# Patient Record
Sex: MALE | URBAN - METROPOLITAN AREA
[De-identification: names, ages, dates, MRNs, and addresses within clinical notes are randomized per-mention and may not be internally consistent; named-entity substitution may affect disease eponyms.]

---

## 2020-07-27 ENCOUNTER — APPOINTMENT (RX ONLY)
Dept: URBAN - METROPOLITAN AREA CLINIC 16 | Facility: CLINIC | Age: 45
Setting detail: DERMATOLOGY
End: 2020-07-27

## 2020-07-27 DIAGNOSIS — D485 NEOPLASM OF UNCERTAIN BEHAVIOR OF SKIN: ICD-10-CM

## 2020-07-27 DIAGNOSIS — L30.9 DERMATITIS, UNSPECIFIED: ICD-10-CM

## 2020-07-27 PROBLEM — D48.5 NEOPLASM OF UNCERTAIN BEHAVIOR OF SKIN: Status: ACTIVE | Noted: 2020-07-27

## 2020-07-27 PROCEDURE — ? MEDICATION COUNSELING

## 2020-07-27 PROCEDURE — 11104 PUNCH BX SKIN SINGLE LESION: CPT

## 2020-07-27 PROCEDURE — ? COUNSELING

## 2020-07-27 PROCEDURE — ? SHAVE REMOVAL

## 2020-07-27 PROCEDURE — 11300 SHAVE SKIN LESION 0.5 CM/<: CPT | Mod: 59

## 2020-07-27 PROCEDURE — ? TREATMENT REGIMEN

## 2020-07-27 PROCEDURE — ? PRESCRIPTION

## 2020-07-27 PROCEDURE — ? BIOPSY BY PUNCH METHOD

## 2020-07-27 PROCEDURE — ? DIAGNOSIS COMMENT

## 2020-07-27 RX ORDER — DOXYCYCLINE HYCLATE 100 MG/1
CAPSULE, GELATIN COATED ORAL
Qty: 60 | Refills: 0 | Status: ERX | COMMUNITY
Start: 2020-07-27

## 2020-07-27 RX ORDER — CLOBETASOL PROPIONATE 0.5 MG/G
AEROSOL, FOAM TOPICAL
Qty: 1 | Refills: 1 | Status: ERX | COMMUNITY
Start: 2020-07-27

## 2020-07-27 RX ADMIN — CLOBETASOL PROPIONATE: 0.5 AEROSOL, FOAM TOPICAL at 00:00

## 2020-07-27 RX ADMIN — DOXYCYCLINE HYCLATE: 100 CAPSULE, GELATIN COATED ORAL at 00:00

## 2020-07-27 ASSESSMENT — LOCATION SIMPLE DESCRIPTION DERM
LOCATION SIMPLE: ABDOMEN
LOCATION SIMPLE: POSTERIOR SCALP

## 2020-07-27 ASSESSMENT — LOCATION ZONE DERM
LOCATION ZONE: SCALP
LOCATION ZONE: TRUNK

## 2020-07-27 ASSESSMENT — LOCATION DETAILED DESCRIPTION DERM
LOCATION DETAILED: POSTERIOR MID-PARIETAL SCALP
LOCATION DETAILED: EPIGASTRIC SKIN

## 2020-07-27 NOTE — PROCEDURE: BIOPSY BY PUNCH METHOD
Detail Level: Simple
Was A Bandage Applied: Yes
Punch Size In Mm: 4
Biopsy Type: H and E
Anesthesia Type: 1% lidocaine with 1:100,000 epinephrine
Anesthesia Volume In Cc (Will Not Render If 0): 1.5
Additional Anesthesia Volume In Cc (Will Not Render If 0): 0
Hemostasis: None
Epidermal Sutures: 4-0 Prolene
Wound Care: Petrolatum
Dressing: telfa dressing
Suture Removal: 14 days
Patient Will Remove Sutures At Home?: No
Lab: 6
Consent: Written consent was obtained and risks were reviewed including but not limited to scarring, infection, bleeding, scabbing, incomplete removal, nerve damage and allergy to anesthesia.
Post-Care Instructions: I reviewed with the patient in detail post-care instructions. Patient is to keep the biopsy site dry overnight, and then apply bacitracin twice daily until healed. Patient may apply hydrogen peroxide soaks to remove any crusting.
Home Suture Removal Text: Patient was provided a home suture removal kit and will remove their sutures at home.  If they have any questions or difficulties they will call the office.
Notification Instructions: Patient will be notified of biopsy results. However, patient instructed to call the office if not contacted within 2 weeks.
Billing Type: Third-Party Bill
Information: Selecting Yes will display possible errors in your note based on the variables you have selected. This validation is only offered as a suggestion for you. PLEASE NOTE THAT THE VALIDATION TEXT WILL BE REMOVED WHEN YOU FINALIZE YOUR NOTE. IF YOU WANT TO FAX A PRELIMINARY NOTE YOU WILL NEED TO TOGGLE THIS TO 'NO' IF YOU DO NOT WANT IT IN YOUR FAXED NOTE.

## 2020-07-27 NOTE — PROCEDURE: SHAVE REMOVAL
Medical Necessity Information: It is in your best interest to select a reason for this procedure from the list below. All of these items fulfill various CMS LCD requirements except the new and changing color options.
Medical Necessity Clause: This procedure was medically necessary because the lesion that was treated was:
Lab: 6
Detail Level: Detailed
Was A Bandage Applied: Yes
Size Of Lesion In Cm (Required): 0.5
X Size Of Lesion In Cm (Optional): 0
Biopsy Method: 15 blade
Anesthesia Type: 1% lidocaine without epinephrine
Anesthesia Volume In Cc: 1.5
Hemostasis: Aluminum Chloride
Wound Care: Petrolatum
Path Notes (To The Dermatopathologist): Please check margins.
Render Path Notes In Note?: No
Consent was obtained from the patient. The risks and benefits to therapy were discussed in detail. Specifically, the risks of infection, scarring, bleeding, prolonged wound healing, incomplete removal, allergy to anesthesia, nerve injury and recurrence were addressed. Prior to the procedure, the treatment site was clearly identified and confirmed by the patient. All components of Universal Protocol/PAUSE Rule completed.
Post-Care Instructions: I reviewed with the patient in detail post-care instructions. Patient is to keep the biopsy site dry overnight, and then apply bacitracin twice daily until healed. Patient may apply hydrogen peroxide soaks to remove any crusting.
Notification Instructions: Patient will be notified of biopsy results. However, patient instructed to call the office if not contacted within 2 weeks.
Billing Type: Third-Party Bill

## 2020-07-27 NOTE — PROCEDURE: MEDICATION COUNSELING
Xelfrediz Pregnancy And Lactation Text: This medication is Pregnancy Category D and is not considered safe during pregnancy.  The risk during breast feeding is also uncertain.

## 2020-07-27 NOTE — PROCEDURE: DIAGNOSIS COMMENT
Comment: Erythematous scarred patch on posterior parietal scalp with focal crusting around hair follicles. DDx: lichen planopilaris vs discoid lupus vs folliculitis decalvans (presentation may be altered due to current use of antibiotics). Biopsy today to try to differentiate between these entities. Plan to start oral doxycycline and topical steroid, further treatment plan pending biopsy results. \\n\\nNote: patient is currently on penicillin for recent tooth infection but states that his last day of taking it was this morning.
Detail Level: Simple

## 2020-08-10 ENCOUNTER — APPOINTMENT (RX ONLY)
Dept: URBAN - METROPOLITAN AREA CLINIC 16 | Facility: CLINIC | Age: 45
Setting detail: DERMATOLOGY
End: 2020-08-10

## 2020-08-10 DIAGNOSIS — L66.2 FOLLICULITIS DECALVANS: ICD-10-CM

## 2020-08-10 DIAGNOSIS — Z48.02 ENCOUNTER FOR REMOVAL OF SUTURES: ICD-10-CM

## 2020-08-10 PROCEDURE — ? COUNSELING

## 2020-08-10 PROCEDURE — ? DIAGNOSIS COMMENT

## 2020-08-10 PROCEDURE — ? TREATMENT REGIMEN

## 2020-08-10 PROCEDURE — ? MEDICATION COUNSELING

## 2020-08-10 PROCEDURE — 99213 OFFICE O/P EST LOW 20 MIN: CPT

## 2020-08-10 PROCEDURE — ? PRESCRIPTION

## 2020-08-10 PROCEDURE — ? SUTURE REMOVAL (NO GLOBAL PERIOD)

## 2020-08-10 RX ORDER — DOXYCYCLINE HYCLATE 100 MG/1
CAPSULE, GELATIN COATED ORAL
Qty: 60 | Refills: 1 | Status: ERX

## 2020-08-10 ASSESSMENT — LOCATION ZONE DERM: LOCATION ZONE: SCALP

## 2020-08-10 ASSESSMENT — LOCATION DETAILED DESCRIPTION DERM
LOCATION DETAILED: POSTERIOR MID-PARIETAL SCALP
LOCATION DETAILED: HAIR

## 2020-08-10 ASSESSMENT — LOCATION SIMPLE DESCRIPTION DERM
LOCATION SIMPLE: POSTERIOR SCALP
LOCATION SIMPLE: HAIR

## 2020-08-10 NOTE — PROCEDURE: MEDICATION COUNSELING
Routing refill request to provider for review/approval because:  Medication is reported/historical  Please authorize if appropriate.  Thanks,  Caroline Harmon RN           Opioid Counseling: I discussed with the patient the potential side effects of opioids including but not limited to addiction, altered mental status, and depression. I stressed avoiding alcohol, benzodiazepines, muscle relaxants and sleep aids unless specifically okayed by a physician. The patient verbalized understanding of the proper use and possible adverse effects of opioids. All of the patient's questions and concerns were addressed. They were instructed to flush the remaining pills down the toilet if they did not need them for pain.

## 2020-08-10 NOTE — PROCEDURE: DIAGNOSIS COMMENT
Comment: Biopsy-proven at last visit under [id number], consistent with inflammatory scarring alopecia. 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 Reviewed diagnosis and treatment options in detail today, reviewed natural progression of disease including risk of relapse off of treatment. Discussed that this is a scarring hair loss and that patient already has scarring on exam - reviewed realistic expectations for hair regrowth. Did not perform culture at last visit as patient was on penicillin for tooth infection and not performing today as no pustules on exam and patient is on doxycycline. \\n\\nPatient improving on exam today after starting doxycycline and topical clobetasol foam 2 weeks ago, less inflammation on exam today. He says that symptomatically he is improved and noticing less bumps compared to a few weeks ago. Given improvement, no need to add IL TAC at this time. Plan to continue doxycycline for an additional 10 weeks and stop at f/u if still well controlled. Pending response, other treatment options to consider in future including oral clindamycin + rifampin or oral isotretinoin. Comment: Biopsy-proven at last visit under [id number], consistent with inflammatory scarring alopecia. 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 Reviewed diagnosis and treatment options in detail today, reviewed natural progression of disease including risk of relapse off of treatment. Discussed that this is a scarring hair loss and that patient already has scarring on exam - reviewed realistic expectations for hair regrowth. Did not perform culture at last visit as patient was on penicillin for tooth infection and not performing today as no pustules on exam and patient is on doxycycline. \\n\\nPatient improving on exam today after starting doxycycline and topical clobetasol foam 2 weeks ago, less inflammation on exam today. He says that symptomatically he is improved and noticing less bumps compared to a few weeks ago. Given improvement, no need to add IL TAC at this time. Plan to continue doxycycline for an additional 10 weeks and stop at f/u if still well controlled. Pending response, other treatment options to consider in future including oral clindamycin + rifampin or oral isotretinoin.

## 2020-08-12 ENCOUNTER — APPOINTMENT (RX ONLY)
Dept: URBAN - METROPOLITAN AREA CLINIC 16 | Facility: CLINIC | Age: 45
Setting detail: DERMATOLOGY
End: 2020-08-12

## 2020-08-12 DIAGNOSIS — L66.2 FOLLICULITIS DECALVANS: ICD-10-CM

## 2020-08-12 PROCEDURE — ? PRESCRIPTION

## 2020-08-12 PROCEDURE — ? COUNSELING

## 2020-08-12 PROCEDURE — ? DIAGNOSIS COMMENT

## 2020-08-12 RX ORDER — MINOCYCLINE HYDROCHLORIDE 100 MG/1
CAPSULE ORAL BID
Qty: 60 | Refills: 2 | Status: ERX | COMMUNITY
Start: 2020-08-12

## 2020-08-12 RX ADMIN — MINOCYCLINE HYDROCHLORIDE: 100 CAPSULE ORAL at 00:00

## 2020-08-12 NOTE — PROCEDURE: DIAGNOSIS COMMENT
Detail Level: Simple
Comment: Patient’s wife called that he is developing too much sun sensitivity on doxycycline. Sending in prescription for minocycline instead. MA to inform patient.

## 2020-10-19 ENCOUNTER — APPOINTMENT (RX ONLY)
Dept: URBAN - METROPOLITAN AREA CLINIC 16 | Facility: CLINIC | Age: 45
Setting detail: DERMATOLOGY
End: 2020-10-19

## 2020-10-19 DIAGNOSIS — L66.2 FOLLICULITIS DECALVANS: ICD-10-CM

## 2020-10-19 PROCEDURE — 99213 OFFICE O/P EST LOW 20 MIN: CPT

## 2020-10-19 PROCEDURE — ? COUNSELING

## 2020-10-19 PROCEDURE — ? DIAGNOSIS COMMENT

## 2020-10-19 PROCEDURE — ? PRESCRIPTION

## 2020-10-19 PROCEDURE — ? MEDICATION COUNSELING

## 2020-10-19 PROCEDURE — ? TREATMENT REGIMEN

## 2020-10-19 RX ORDER — CLINDAMYCIN PHOSPHATE 10 MG/ML
SOLUTION TOPICAL
Qty: 1 | Refills: 3 | Status: ERX | COMMUNITY
Start: 2020-10-19

## 2020-10-19 RX ORDER — DOXYCYCLINE HYCLATE 100 MG/1
CAPSULE, GELATIN COATED ORAL
Qty: 90 | Refills: 0 | Status: ERX

## 2020-10-19 RX ADMIN — CLINDAMYCIN PHOSPHATE: 10 SOLUTION TOPICAL at 00:00

## 2020-10-19 ASSESSMENT — LOCATION SIMPLE DESCRIPTION DERM: LOCATION SIMPLE: POSTERIOR SCALP

## 2020-10-19 ASSESSMENT — LOCATION ZONE DERM: LOCATION ZONE: SCALP

## 2020-10-19 ASSESSMENT — LOCATION DETAILED DESCRIPTION DERM: LOCATION DETAILED: POSTERIOR MID-PARIETAL SCALP

## 2020-10-19 NOTE — PROCEDURE: DIAGNOSIS COMMENT
Comment: Biopsy-proven under [id number], consistent with inflammatory scarring alopecia. 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 \\n\\nDoing well on exam, no clinical inflammation and patient is asymptomatic. He stopped doxycycline in August and switched to minocycline due to sun exposure. He states that he was still getting occasional breakout on minocycline and was not as well controlled so he just switched back to doxycycline 100mg daily (tolerated better than BID due to GI upset) about 2 weeks ago. Given that he just recently noticed resolution of flare, recommend continuing doxycycline for an additional 6-8 weeks prior to tapering off. Recommend starting topical therapy as below. \\n\\nGiven improvement, no need to add IL TAC at this time. Pending response to discontinuing doxycycline, other treatment options to consider in future that were discussed with patient today include oral clindamycin + rifampin or oral isotretinoin. Advised patient to call if he is flaring off of doxycycline. Comment: Biopsy-proven under [id number], consistent with inflammatory scarring alopecia. 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 \\n\\nDoing well on exam, no clinical inflammation and patient is asymptomatic. He stopped doxycycline in August and switched to minocycline due to sun exposure. He states that he was still getting occasional breakout on minocycline and was not as well controlled so he just switched back to doxycycline 100mg daily (tolerated better than BID due to GI upset) about 2 weeks ago. Given that he just recently noticed resolution of flare, recommend continuing doxycycline for an additional 6-8 weeks prior to tapering off. Recommend starting topical therapy as below. \\n\\nGiven improvement, no need to add IL TAC at this time. Pending response to discontinuing doxycycline, other treatment options to consider in future that were discussed with patient today include oral clindamycin + rifampin or oral isotretinoin. Advised patient to call if he is flaring off of doxycycline.

## 2020-10-19 NOTE — PROCEDURE: TREATMENT REGIMEN
Initiate Treatment: Wash affected area daily with over-the-counter benzoyl peroxide wash\\nAfterwards, apply clindamycin 1% solution to any bumps in scalp
Detail Level: Simple
Continue Regimen: Continue doxycycline 100mg by mouth once daily (take with meal and full glass of liquid, be very careful with sun exposure as can sunburn more easily). If doing well after 6 weeks, then decrease doxycycline to 100mg every other day x 1-2 weeks and then stop it. If you flare after stopping doxycycline then call the office \\n\\nContinue clobetasol 0.05% foam. Apply to affected area on scalp 1-2 times daily as needed for irritation or itching. Do not apply to face. Use a few times per week when coming off doxycycline

## 2020-10-19 NOTE — PROCEDURE: MEDICATION COUNSELING
Patient is scheduled for surgery with Dr. Mendoza    Spoke or left message with: Patient    Date of Surgery: 6/23/20    Location: [facility]    Post op: 2 weeks    Pre-op with surgeon (if applicable): Complete    H&P: Scheduled with PAC 6/19/20    Additional imaging/appointments: N/A    Surgery packet: N/A     Additional comments: Patient aware he will be contacted to set up COVID test to be done on Saturday   Calcipotriene Pregnancy And Lactation Text: This medication has not been proven safe during pregnancy. It is unknown if this medication is excreted in breast milk.

## 2021-01-04 ENCOUNTER — APPOINTMENT (RX ONLY)
Dept: URBAN - METROPOLITAN AREA CLINIC 16 | Facility: CLINIC | Age: 46
Setting detail: DERMATOLOGY
End: 2021-01-04

## 2021-01-04 DIAGNOSIS — L66.2 FOLLICULITIS DECALVANS: ICD-10-CM

## 2021-01-04 PROCEDURE — ? PRESCRIPTION

## 2021-01-04 PROCEDURE — ? MEDICATION COUNSELING

## 2021-01-04 PROCEDURE — ? TREATMENT REGIMEN

## 2021-01-04 PROCEDURE — 99214 OFFICE O/P EST MOD 30 MIN: CPT

## 2021-01-04 PROCEDURE — ? DIAGNOSIS COMMENT

## 2021-01-04 PROCEDURE — ? COUNSELING

## 2021-01-04 RX ORDER — CLINDAMYCIN PHOSPHATE 10 MG/ML
SOLUTION TOPICAL
Qty: 1 | Refills: 3 | Status: ERX

## 2021-01-04 RX ORDER — DOXYCYCLINE HYCLATE 100 MG/1
CAPSULE, GELATIN COATED ORAL
Qty: 30 | Refills: 1 | Status: ERX

## 2021-01-04 ASSESSMENT — LOCATION SIMPLE DESCRIPTION DERM: LOCATION SIMPLE: POSTERIOR SCALP

## 2021-01-04 ASSESSMENT — LOCATION ZONE DERM: LOCATION ZONE: SCALP

## 2021-01-04 ASSESSMENT — LOCATION DETAILED DESCRIPTION DERM: LOCATION DETAILED: POSTERIOR MID-PARIETAL SCALP

## 2021-01-04 NOTE — PROCEDURE: MEDICATION COUNSELING
[facility]
 
No answer at [facility], message left for call back.
 
No beds at [facility]. Calcipotriene Counseling:  I discussed with the patient the risks of calcipotriene including but not limited to erythema, scaling, itching, and irritation.

## 2021-01-04 NOTE — PROCEDURE: TREATMENT REGIMEN
Detail Level: Simple
Continue Regimen: Restart doxycycline 100mg by mouth once daily (take with meal and full glass of liquid, be very careful with sun exposure as can sunburn more easily). If doing well after 4 weeks, then stop the doxycycline. If you flare after stopping doxycycline then call the office \\n\\nWash affected area daily with over-the-counter benzoyl peroxide wash\\nAfterwards, apply clindamycin 1% solution to any bumps in scalp\\n\\nContinue clobetasol 0.05% foam. Apply to affected area on scalp 1-2 times daily as needed for irritation or itching. Do not apply to face. Use a few times per week when coming off doxycycline

## 2021-01-04 NOTE — PROCEDURE: DIAGNOSIS COMMENT
Comment: Biopsy-proven under [id number], consistent with inflammatory scarring alopecia. 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 \\n\\Radha last visit, patient has doing well without any clinical inflammation and patient was asymptomatic. He stopped the doxycycline about 6 weeks ago but also stopped topical clobetasol and never started topical BPO and clindamycin. Reviewed options in detail today - plan to restart doxycycline and initiate topical therapy as below. Patient declined IL TAC. Discussed other treatment options to consider in future including oral clindamycin + rifampin or oral isotretinoin.\\n\\nPrior treatments: minocycyline without much improvement Comment: Biopsy-proven under [id number], consistent with inflammatory scarring alopecia. 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 \\n\\Radha last visit, patient has doing well without any clinical inflammation and patient was asymptomatic. He stopped the doxycycline about 6 weeks ago but also stopped topical clobetasol and never started topical BPO and clindamycin. Reviewed options in detail today - plan to restart doxycycline and initiate topical therapy as below. Patient declined IL TAC. Discussed other treatment options to consider in future including oral clindamycin + rifampin or oral isotretinoin.\\n\\nPrior treatments: minocycyline without much improvement

## 2021-03-03 ENCOUNTER — APPOINTMENT (RX ONLY)
Dept: URBAN - METROPOLITAN AREA CLINIC 16 | Facility: CLINIC | Age: 46
Setting detail: DERMATOLOGY
End: 2021-03-03

## 2021-03-03 DIAGNOSIS — R20.8 OTHER DISTURBANCES OF SKIN SENSATION: ICD-10-CM

## 2021-03-03 DIAGNOSIS — L66.2 FOLLICULITIS DECALVANS: ICD-10-CM

## 2021-03-03 PROCEDURE — 11900 INJECT SKIN LESIONS </W 7: CPT

## 2021-03-03 PROCEDURE — ? INTRALESIONAL KENALOG

## 2021-03-03 PROCEDURE — ? TREATMENT REGIMEN

## 2021-03-03 PROCEDURE — ? COUNSELING

## 2021-03-03 PROCEDURE — 99214 OFFICE O/P EST MOD 30 MIN: CPT | Mod: 25

## 2021-03-03 PROCEDURE — ? DIAGNOSIS COMMENT

## 2021-03-03 PROCEDURE — ? MEDICATION COUNSELING

## 2021-03-03 PROCEDURE — ? PHOTO-DOCUMENTATION

## 2021-03-03 ASSESSMENT — LOCATION DETAILED DESCRIPTION DERM
LOCATION DETAILED: POSTERIOR MID-PARIETAL SCALP
LOCATION DETAILED: LEFT SUPERIOR PARIETAL SCALP

## 2021-03-03 ASSESSMENT — LOCATION ZONE DERM: LOCATION ZONE: SCALP

## 2021-03-03 ASSESSMENT — LOCATION SIMPLE DESCRIPTION DERM
LOCATION SIMPLE: POSTERIOR SCALP
LOCATION SIMPLE: SCALP

## 2021-03-03 NOTE — PROCEDURE: DIAGNOSIS COMMENT
Comment: Biopsy-proven under [id number], consistent with inflammatory scarring alopecia. 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 \\n\\nPatient still has clinical inflammation on exam today - and appears to have enlargement of the scarred area of alopecia. He was taking doxycycline but then stopped it due to running out of the prescription, restarted it a few days ago. Has been forgetting to use topical therapy. Reviewed treatment options in detail - recommend adding IL TAC, continuing doxycycline, and starting topical therapy on a consistent daily basis. Reviewed regimen in detail, handout provided. \\n\\nPending response at follow-up, discussed consideration for oral clindamycin + rifampin or oral isotretinoin.\\n\\nPrior treatments: minocycyline without much improvement Comment: Biopsy-proven under [id number], consistent with inflammatory scarring alopecia. 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 \\n\\nPatient still has clinical inflammation on exam today - and appears to have enlargement of the scarred area of alopecia. He was taking doxycycline but then stopped it due to running out of the prescription, restarted it a few days ago. Has been forgetting to use topical therapy. Reviewed treatment options in detail - recommend adding IL TAC, continuing doxycycline, and starting topical therapy on a consistent daily basis. Reviewed regimen in detail, handout provided. \\n\\nPending response at follow-up, discussed consideration for oral clindamycin + rifampin or oral isotretinoin.\\n\\nPrior treatments: minocycyline without much improvement

## 2021-03-03 NOTE — PROCEDURE: INTRALESIONAL KENALOG
Detail Level: Zone Detail Level: Detailed Detail Level: Generalized Include Z78.9 (Other Specified Conditions Influencing Health Status) As An Associated Diagnosis?: No

## 2021-03-03 NOTE — PROCEDURE: TREATMENT REGIMEN
Initiate Treatment: Treated with intralesional steroid injection
Detail Level: Simple
Continue Regimen: Continue doxycycline 100mg by mouth once daily (take with meal and full glass of liquid, be very careful with sun exposure as can sunburn more easily) until follow-up appointment \\n\\nWash affected area daily with over-the-counter benzoyl peroxide wash\\nAfterwards, apply clindamycin 1% solution to any bumps in scalp\\n\\nContinue clobetasol 0.05% foam. Apply to affected area on scalp 1-2 times daily. Do not apply to face. Use a few times per week when coming off doxycycline

## 2021-03-15 NOTE — PROCEDURE: MEDICATION COUNSELING
Lab only visit please. Hydroquinone Counseling:  Patient advised that medication may result in skin irritation, lightening (hypopigmentation), dryness, and burning.  In the event of skin irritation, the patient was advised to reduce the amount of the drug applied or use it less frequently.  Rarely, spots that are treated with hydroquinone can become darker (pseudoochronosis).  Should this occur, patient instructed to stop medication and call the office. The patient verbalized understanding of the proper use and possible adverse effects of hydroquinone.  All of the patient's questions and concerns were addressed.

## 2021-03-22 NOTE — PROCEDURE: MEDICATION COUNSELING
0 Topical Retinoid counseling:  Patient advised to apply a pea-sized amount only at bedtime and wait 30 minutes after washing their face before applying.  If too drying, patient may add a non-comedogenic moisturizer. The patient verbalized understanding of the proper use and possible adverse effects of retinoids.  All of the patient's questions and concerns were addressed.

## 2021-04-06 ENCOUNTER — APPOINTMENT (RX ONLY)
Dept: URBAN - METROPOLITAN AREA CLINIC 16 | Facility: CLINIC | Age: 46
Setting detail: DERMATOLOGY
End: 2021-04-06

## 2021-04-06 DIAGNOSIS — R20.8 OTHER DISTURBANCES OF SKIN SENSATION: ICD-10-CM

## 2021-04-06 DIAGNOSIS — L66.2 FOLLICULITIS DECALVANS: ICD-10-CM

## 2021-04-06 PROCEDURE — ? MEDICATION COUNSELING

## 2021-04-06 PROCEDURE — ? PRESCRIPTION

## 2021-04-06 PROCEDURE — 99214 OFFICE O/P EST MOD 30 MIN: CPT | Mod: 25

## 2021-04-06 PROCEDURE — ? INTRALESIONAL KENALOG

## 2021-04-06 PROCEDURE — ? DIAGNOSIS COMMENT

## 2021-04-06 PROCEDURE — ? PHOTO-DOCUMENTATION

## 2021-04-06 PROCEDURE — ? TREATMENT REGIMEN

## 2021-04-06 PROCEDURE — ? COUNSELING

## 2021-04-06 PROCEDURE — 11900 INJECT SKIN LESIONS </W 7: CPT

## 2021-04-06 RX ORDER — CLINDAMYCIN HYDROCHLORIDE 300 MG/1
CAPSULE ORAL
Qty: 60 | Refills: 1 | Status: ERX | COMMUNITY
Start: 2021-04-06

## 2021-04-06 RX ORDER — RIFAMPIN 300 MG/1
CAPSULE ORAL
Qty: 60 | Refills: 1 | Status: ERX | COMMUNITY
Start: 2021-04-06

## 2021-04-06 RX ADMIN — RIFAMPIN: 300 CAPSULE ORAL at 00:00

## 2021-04-06 RX ADMIN — CLINDAMYCIN HYDROCHLORIDE: 300 CAPSULE ORAL at 00:00

## 2021-04-06 ASSESSMENT — LOCATION SIMPLE DESCRIPTION DERM
LOCATION SIMPLE: SCALP
LOCATION SIMPLE: POSTERIOR SCALP

## 2021-04-06 ASSESSMENT — LOCATION ZONE DERM: LOCATION ZONE: SCALP

## 2021-04-06 ASSESSMENT — LOCATION DETAILED DESCRIPTION DERM
LOCATION DETAILED: LEFT SUPERIOR PARIETAL SCALP
LOCATION DETAILED: POSTERIOR MID-PARIETAL SCALP

## 2021-04-06 NOTE — PROCEDURE: MEDICATION COUNSELING
80 Opioid Counseling: I discussed with the patient the potential side effects of opioids including but not limited to addiction, altered mental status, and depression. I stressed avoiding alcohol, benzodiazepines, muscle relaxants and sleep aids unless specifically okayed by a physician. The patient verbalized understanding of the proper use and possible adverse effects of opioids. All of the patient's questions and concerns were addressed. They were instructed to flush the remaining pills down the toilet if they did not need them for pain.

## 2021-04-06 NOTE — PROCEDURE: TREATMENT REGIMEN
Initiate Treatment: Start clindamycin 300mg twice daily + rifampin 300mg twice daily with a meal. Take 300mg by mouth capsule twice daily; if too much stomach upset, decrease to once daily or call the office
Detail Level: Simple
Discontinue Regimen: Remain off doxycycline
Continue Regimen: Treated with intralesional steroid injection\\n\\nWash affected area daily with over-the-counter benzoyl peroxide wash\\nAfterwards, apply clindamycin 1% solution to any bumps in scalp\\n\\nContinue clobetasol 0.05% foam. Apply to affected area on scalp 1-2 times daily. Do not apply to face. Use a few times per week when coming off doxycycline

## 2021-04-06 NOTE — PROCEDURE: DIAGNOSIS COMMENT
Comment: Biopsy-proven under [id number], consistent with inflammatory scarring alopecia. 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 \\n\\nPatient still has clinical inflammation on exam today but scarred area of alopecia has not increased in size from last visit. He stopped doxycycline a few weeks ago because he ran out of the prescription and did not  refill, also notes mild GI upset from doxycycline and does not want to take it anymore due to photosensitivity. He has been using topical therapy more consistently. \\n\\nReviewed treatment options in detail - recommend continuing IL TAC and start oral clindamycin + rifampin. To consider in future: oral isotretinoin.\\n\\nPrior treatments: minocycyline without much improvement. Doxycycline with improvement but mild GI upset and sensitivity to the sun Comment: Biopsy-proven under [id number], consistent with inflammatory scarring alopecia. 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 \\n\\nPatient still has clinical inflammation on exam today but scarred area of alopecia has not increased in size from last visit. He stopped doxycycline a few weeks ago because he ran out of the prescription and did not  refill, also notes mild GI upset from doxycycline and does not want to take it anymore due to photosensitivity. He has been using topical therapy more consistently. \\n\\nReviewed treatment options in detail - recommend continuing IL TAC and start oral clindamycin + rifampin. To consider in future: oral isotretinoin.\\n\\nPrior treatments: minocycyline without much improvement. Doxycycline with improvement but mild GI upset and sensitivity to the sun

## 2021-04-06 NOTE — PROCEDURE: MEDICATION COUNSELING
1.57 Albendazole Counseling:  I discussed with the patient the risks of albendazole including but not limited to cytopenia, kidney damage, nausea/vomiting and severe allergy.  The patient understands that this medication is being used in an off-label manner.

## 2021-08-10 ENCOUNTER — APPOINTMENT (RX ONLY)
Dept: URBAN - METROPOLITAN AREA CLINIC 16 | Facility: CLINIC | Age: 46
Setting detail: DERMATOLOGY
End: 2021-08-10

## 2021-08-10 DIAGNOSIS — L66.2 FOLLICULITIS DECALVANS: ICD-10-CM

## 2021-08-10 DIAGNOSIS — R20.8 OTHER DISTURBANCES OF SKIN SENSATION: ICD-10-CM

## 2021-08-10 PROCEDURE — 11900 INJECT SKIN LESIONS </W 7: CPT

## 2021-08-10 PROCEDURE — 99214 OFFICE O/P EST MOD 30 MIN: CPT | Mod: 25

## 2021-08-10 PROCEDURE — ? PHOTO-DOCUMENTATION

## 2021-08-10 PROCEDURE — ? DIAGNOSIS COMMENT

## 2021-08-10 PROCEDURE — ? MEDICATION COUNSELING

## 2021-08-10 PROCEDURE — ? INTRALESIONAL KENALOG

## 2021-08-10 PROCEDURE — ? COUNSELING

## 2021-08-10 PROCEDURE — ? TREATMENT REGIMEN

## 2021-08-10 ASSESSMENT — LOCATION DETAILED DESCRIPTION DERM
LOCATION DETAILED: LEFT SUPERIOR PARIETAL SCALP
LOCATION DETAILED: POSTERIOR MID-PARIETAL SCALP

## 2021-08-10 ASSESSMENT — LOCATION ZONE DERM: LOCATION ZONE: SCALP

## 2021-08-10 ASSESSMENT — LOCATION SIMPLE DESCRIPTION DERM
LOCATION SIMPLE: SCALP
LOCATION SIMPLE: POSTERIOR SCALP

## 2021-08-10 NOTE — PROCEDURE: DIAGNOSIS COMMENT
Comment: Biopsy-proven under [id number], consistent with inflammatory scarring alopecia. 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 \\n\\nPatient still has clinical inflammation on exam today but scarred He has been using topical therapy more consistently. \\n\\nReviewed treatment options in detail - recommend continuing IL TAC and start oral clindamycin + rifampin. To consider in future: oral isotretinoin.\\n\\nPrior treatments: minocycyline without much improvement. Doxycycline with improvement but mild GI upset and sensitivity to the sun Comment: Biopsy-proven under [id number], consistent with inflammatory scarring alopecia. 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 \\n\\nPatient still has clinical inflammation on exam today but scarred He has been using topical therapy more consistently. \\n\\nReviewed treatment options in detail - recommend continuing IL TAC and start oral clindamycin + rifampin. To consider in future: oral isotretinoin.\\n\\nPrior treatments: minocycyline without much improvement. Doxycycline with improvement but mild GI upset and sensitivity to the sun

## 2021-08-10 NOTE — HPI: INFECTION (FOLLICULITIS)
How Severe Is It?: moderate
Is This A New Presentation, Or A Follow-Up?: Follow Up Folliculitis
Additional History: Patient stopped taking “red pill” at beginning of summer but started taking again two weeks ago due to flare.

## 2021-08-10 NOTE — PROCEDURE: TREATMENT REGIMEN
Initiate Treatment: Pt was off treatment plan for the summer (confused about abx was not supposed to be on doxy re: sunsesativity...poor historian \"red pills and green pills\"...flared and restarted abxs x two weeks ago...for now we will inject lesions and with the first sign of a flare pt to call for instructions\\n\\n*** previous tx plan below*** \\n\\nStart clindamycin 300mg twice daily + rifampin 300mg twice daily with a meal. Take 300mg by mouth capsule twice daily; if too much stomach upset, decrease to once daily or call the office
Detail Level: Simple
Discontinue Regimen: Remain off doxycycline
Continue Regimen: Treated with intralesional steroid injection\\n\\nWash affected area daily with over-the-counter benzoyl peroxide wash\\nAfterwards, apply clindamycin 1% solution to any bumps in scalp\\n\\nContinue clobetasol 0.05% foam. Apply to affected area on scalp 1-2 times daily. Do not apply to face.

## 2022-03-02 NOTE — PROCEDURE: TREATMENT REGIMEN
There are no Wet Read(s) to document.
Detail Level: Simple
Continue Regimen: Continue doxycycline 100mg by mouth twice daily (take with meal and full glass of liquid, be very careful with sun exposure as can sunburn more easily). If doing well after another month, can try decreasing doxycycline to 100mg daily, but it is fine to continue twice daily until I see you for follow-up\\n\\nContinue clobetasol 0.05% foam. Apply to affected area on scalp 1-2 times daily as needed for irritation or itching. Do not apply to face

## 2022-07-05 ENCOUNTER — APPOINTMENT (RX ONLY)
Dept: URBAN - METROPOLITAN AREA CLINIC 16 | Facility: CLINIC | Age: 47
Setting detail: DERMATOLOGY
End: 2022-07-05

## 2022-07-05 DIAGNOSIS — L30.4 ERYTHEMA INTERTRIGO: ICD-10-CM

## 2022-07-05 DIAGNOSIS — L66.2 FOLLICULITIS DECALVANS: ICD-10-CM

## 2022-07-05 PROCEDURE — ? PRESCRIPTION MEDICATION MANAGEMENT

## 2022-07-05 PROCEDURE — ? DIAGNOSIS COMMENT

## 2022-07-05 PROCEDURE — ? TREATMENT REGIMEN

## 2022-07-05 PROCEDURE — ? MEDICATION COUNSELING

## 2022-07-05 PROCEDURE — 99214 OFFICE O/P EST MOD 30 MIN: CPT

## 2022-07-05 PROCEDURE — ? COUNSELING

## 2022-07-05 PROCEDURE — ? PRESCRIPTION

## 2022-07-05 RX ORDER — NYSTATIN 100000 [USP'U]/G
POWDER TOPICAL
Qty: 60 | Refills: 2 | Status: ERX | COMMUNITY
Start: 2022-07-05

## 2022-07-05 RX ORDER — CLOBETASOL PROPIONATE 0.5 MG/G
AEROSOL, FOAM TOPICAL
Qty: 100 | Refills: 1 | Status: ERX | COMMUNITY
Start: 2022-07-05

## 2022-07-05 RX ORDER — KETOCONAZOLE 20 MG/G
CREAM TOPICAL
Qty: 60 | Refills: 3 | Status: ERX | COMMUNITY
Start: 2022-07-05

## 2022-07-05 RX ORDER — BENZOYL PEROXIDE 100 MG/G
LOTION TOPICAL
Qty: 237 | Refills: 3 | Status: ERX | COMMUNITY
Start: 2022-07-05

## 2022-07-05 RX ORDER — CLINDAMYCIN PHOSPHATE 10 MG/ML
SOLUTION TOPICAL
Qty: 60 | Refills: 3 | Status: ERX | COMMUNITY
Start: 2022-07-05

## 2022-07-05 RX ORDER — TRIAMCINOLONE ACETONIDE 1 MG/G
CREAM TOPICAL
Qty: 45 | Refills: 0 | Status: ERX | COMMUNITY
Start: 2022-07-05

## 2022-07-05 RX ADMIN — NYSTATIN: 100000 POWDER TOPICAL at 00:00

## 2022-07-05 RX ADMIN — CLOBETASOL PROPIONATE: 0.5 AEROSOL, FOAM TOPICAL at 00:00

## 2022-07-05 RX ADMIN — BENZOYL PEROXIDE: 100 LOTION TOPICAL at 00:00

## 2022-07-05 RX ADMIN — TRIAMCINOLONE ACETONIDE: 1 CREAM TOPICAL at 00:00

## 2022-07-05 RX ADMIN — CLINDAMYCIN PHOSPHATE: 10 SOLUTION TOPICAL at 00:00

## 2022-07-05 RX ADMIN — KETOCONAZOLE: 20 CREAM TOPICAL at 00:00

## 2022-07-05 ASSESSMENT — LOCATION SIMPLE DESCRIPTION DERM
LOCATION SIMPLE: LEFT THIGH
LOCATION SIMPLE: POSTERIOR SCALP

## 2022-07-05 ASSESSMENT — LOCATION DETAILED DESCRIPTION DERM
LOCATION DETAILED: POSTERIOR MID-PARIETAL SCALP
LOCATION DETAILED: LEFT ANTERIOR PROXIMAL THIGH

## 2022-07-05 ASSESSMENT — LOCATION ZONE DERM
LOCATION ZONE: LEG
LOCATION ZONE: SCALP

## 2022-07-05 NOTE — PROCEDURE: DIAGNOSIS COMMENT
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 \\n\\nPatient has clinical inflammation on exam today but scarred area of alopecia similar to prior visit. He has been off all topical therapy. He doesn't think that he ever took the oral clindamycin + rifampin that was prescribed last year. He prefers to start with topical therapy alone and reassess at f/u visit \\n\\nTo consider in future: oral clindamycin + rifampin; isotretinoin.\\n\\nPrior treatments: minocycyline without much improvement. Doxycycline with improvement but mild GI upset and sensitivity to the sun Comment: Biopsy-proven under [id number], consistent with inflammatory scarring alopecia. 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 \\n\\nPatient has clinical inflammation on exam today but scarred area of alopecia similar to prior visit. He has been off all topical therapy. He doesn't think that he ever took the oral clindamycin + rifampin that was prescribed last year. He prefers to start with topical therapy alone and reassess at f/u visit \\n\\nTo consider in future: oral clindamycin + rifampin; isotretinoin.\\n\\nPrior treatments: minocycyline without much improvement. Doxycycline with improvement but mild GI upset and sensitivity to the sun

## 2022-07-05 NOTE — PROCEDURE: TREATMENT REGIMEN
Initiate Treatment: Restart topical regimen: \\nWash affected area daily with benzoyl peroxide wash\\nAfterwards, apply clindamycin 1% solution to any bumps in scalp\\n\\nClobetasol 0.05% foam. Apply to affected area on scalp 1-2 times daily. Do not apply to face. Use a few times per week when coming off doxycycline
Detail Level: Simple
Discontinue Regimen: Remain off doxycycline\\nPatient doesn't think he ever started oral clindamycin + rifampin
Continue Regimen: \\n\\n

## 2022-07-05 NOTE — PROCEDURE: MIPS QUALITY
Quality 226: Preventive Care And Screening: Tobacco Use: Screening And Cessation Intervention: Patient screened for tobacco use and is an ex/non-smoker
Quality 110: Preventive Care And Screening: Influenza Immunization: Influenza Immunization Administered during Influenza season
Quality 431: Preventive Care And Screening: Unhealthy Alcohol Use - Screening: Patient not identified as an unhealthy alcohol user when screened for unhealthy alcohol use using a systematic screening method
Quality 47: Advance Care Plan: Advance Care Planning discussed and documented; advance care plan or surrogate decision maker documented in the medical record.
Detail Level: Detailed

## 2022-07-05 NOTE — PROCEDURE: PRESCRIPTION MEDICATION MANAGEMENT
Detail Level: Zone
Render In Strict Bullet Format?: No
Initiate Treatment: ketoconazole 2 % topical cream \\nApply to rash in skin folds BID when flaring\\n\\ntriamcinolone acetonide 0.1 % topical cream \\nApply to itchy rash BID prn. If needed beyond 2 weeks, then decrease frequency of use to Monday-Friday, off on weekends, stop when improved. Avoid daily long-term use, can cause stretch marks\\n\\n\\nnystatin 100,000 unit/gram topical powder \\nApply to skin folds daily as needed for prevention

## 2022-07-05 NOTE — PROCEDURE: MEDICATION COUNSELING
Please order ct abd/pelvis with oral and iv contrast, diagnosis is abdominal pain   Albendazole Pregnancy And Lactation Text: This medication is Pregnancy Category C and it isn't known if it is safe during pregnancy. It is also excreted in breast milk.

## 2022-08-22 ENCOUNTER — APPOINTMENT (RX ONLY)
Dept: URBAN - METROPOLITAN AREA CLINIC 16 | Facility: CLINIC | Age: 47
Setting detail: DERMATOLOGY
End: 2022-08-22

## 2022-08-22 DIAGNOSIS — L30.4 ERYTHEMA INTERTRIGO: ICD-10-CM | Status: RESOLVED

## 2022-08-22 DIAGNOSIS — L66.2 FOLLICULITIS DECALVANS: ICD-10-CM

## 2022-08-22 PROCEDURE — 99213 OFFICE O/P EST LOW 20 MIN: CPT

## 2022-08-22 PROCEDURE — ? COUNSELING

## 2022-08-22 PROCEDURE — ? PRESCRIPTION MEDICATION MANAGEMENT

## 2022-08-22 PROCEDURE — ? DIAGNOSIS COMMENT

## 2022-08-22 PROCEDURE — ? TREATMENT REGIMEN

## 2022-08-22 PROCEDURE — ? MEDICATION COUNSELING

## 2022-08-22 ASSESSMENT — LOCATION ZONE DERM
LOCATION ZONE: LEG
LOCATION ZONE: SCALP

## 2022-08-22 ASSESSMENT — LOCATION DETAILED DESCRIPTION DERM
LOCATION DETAILED: LEFT ANTERIOR PROXIMAL THIGH
LOCATION DETAILED: POSTERIOR MID-PARIETAL SCALP

## 2022-08-22 ASSESSMENT — LOCATION SIMPLE DESCRIPTION DERM
LOCATION SIMPLE: LEFT THIGH
LOCATION SIMPLE: POSTERIOR SCALP

## 2022-08-22 NOTE — PROCEDURE: TREATMENT REGIMEN
Detail Level: Simple
Discontinue Regimen: Remain off doxycycline\\nPatient doesn't think he ever started oral clindamycin + rifampin
Continue Regimen: Wash affected area daily with benzoyl peroxide wash\\nAfterwards, apply clindamycin 1% solution to any bumps in scalp\\n\\nClobetasol 0.05% foam. Apply to affected area on scalp 1-2 times daily. Do not apply to face. Use a few times per week when coming off doxycycline

## 2022-08-22 NOTE — PROCEDURE: DIAGNOSIS COMMENT
Comment: Biopsy-proven under [id number], consistent with inflammatory scarring alopecia. 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 \\n\\nClinical inflammation improved but not resolved on exam today, symptomatically patient reports that he is improved. Scarred area of alopecia similar to prior visit. He has been inconsistent with topical therapy - discussed use more consistently. He prefers to continue with topical therapy alone for now\\n\\nTo consider in future: oral clindamycin + rifampin; isotretinoin.\\n\\nPrior treatments: minocycyline without much improvement. Doxycycline with improvement but mild GI upset and sensitivity to the sun Comment: Biopsy-proven under [id number], consistent with inflammatory scarring alopecia. 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 \\n\\nClinical inflammation improved but not resolved on exam today, symptomatically patient reports that he is improved. Scarred area of alopecia similar to prior visit. He has been inconsistent with topical therapy - discussed use more consistently. He prefers to continue with topical therapy alone for now\\n\\nTo consider in future: oral clindamycin + rifampin; isotretinoin.\\n\\nPrior treatments: minocycyline without much improvement. Doxycycline with improvement but mild GI upset and sensitivity to the sun

## 2022-08-22 NOTE — PROCEDURE: PRESCRIPTION MEDICATION MANAGEMENT
Detail Level: Zone
Render In Strict Bullet Format?: No
Continue Regimen: Only as needed for flares: \\nketoconazole 2 % topical cream \\nApply to rash in skin folds BID when flaring\\n\\ntriamcinolone acetonide 0.1 % topical cream \\nApply to itchy rash BID prn. If needed beyond 2 weeks, then decrease frequency of use to Monday-Friday, off on weekends, stop when improved. Avoid daily long-term use, can cause stretch marks\\n\\n\\nnystatin 100,000 unit/gram topical powder \\nApply to skin folds daily as needed for prevention

## 2022-09-21 NOTE — PROCEDURE: MEDICATION COUNSELING
Allergy to vancomycin. Reports hives per nurse. Will change to zyvox pending attending re-evaluation in am. Pharmacy consulted to renally dose    Discussed with Dr. Darci Garcia. Acitretin Counseling:  I discussed with the patient the risks of acitretin including but not limited to hair loss, dry lips/skin/eyes, liver damage, hyperlipidemia, depression/suicidal ideation, photosensitivity.  Serious rare side effects can include but are not limited to pancreatitis, pseudotumor cerebri, bony changes, clot formation/stroke/heart attack.  Patient understands that alcohol is contraindicated since it can result in liver toxicity and significantly prolong the elimination of the drug by many years.

## 2022-10-14 NOTE — PROCEDURE: MEDICATION COUNSELING
Consent (Near Eyelid Margin)/Introductory Paragraph: The rationale for Mohs was explained to the patient and consent was obtained. The risks, benefits and alternatives to therapy were discussed in detail. Specifically, the risks of ectropion or eyelid deformity, infection, scarring, bleeding, prolonged wound healing, incomplete removal, allergy to anesthesia, nerve injury and recurrence were addressed. Prior to the procedure, the treatment site was clearly identified and confirmed by the patient. All components of Universal Protocol/PAUSE Rule completed. Glycopyrrolate Pregnancy And Lactation Text: This medication is Pregnancy Category B and is considered safe during pregnancy. It is unknown if it is excreted breast milk.

## 2022-11-18 NOTE — PROCEDURE: INTRALESIONAL KENALOG
Topical Retinoid counseling:  Patient advised to apply a pea-sized amount only at bedtime and wait 30 minutes after washing their face before applying.  If too drying, patient may add a non-comedogenic moisturizer. The patient verbalized understanding of the proper use and possible adverse effects of retinoids.  All of the patient's questions and concerns were addressed. Consent: The risks of atrophy were reviewed with the patient.

## 2023-03-22 NOTE — PROCEDURE: MEDICATION COUNSELING
Samuel Giron Nsaids Counseling: NSAID Counseling: I discussed with the patient that NSAIDs should be taken with food. Prolonged use of NSAIDs can result in the development of stomach ulcers.  Patient advised to stop taking NSAIDs if abdominal pain occurs.  The patient verbalized understanding of the proper use and possible adverse effects of NSAIDs.  All of the patient's questions and concerns were addressed.

## 2023-03-30 NOTE — PROCEDURE: MEDICATION COUNSELING
Principal Discharge DX:	Epistaxis   1 Azithromycin Counseling:  I discussed with the patient the risks of azithromycin including but not limited to GI upset, allergic reaction, drug rash, diarrhea, and yeast infections.

## 2023-06-06 ENCOUNTER — APPOINTMENT (RX ONLY)
Dept: URBAN - METROPOLITAN AREA CLINIC 16 | Facility: CLINIC | Age: 48
Setting detail: DERMATOLOGY
End: 2023-06-06

## 2023-06-06 DIAGNOSIS — L82.1 OTHER SEBORRHEIC KERATOSIS: ICD-10-CM

## 2023-06-06 DIAGNOSIS — L66.2 FOLLICULITIS DECALVANS: ICD-10-CM | Status: UNCHANGED

## 2023-06-06 DIAGNOSIS — B36.0 PITYRIASIS VERSICOLOR: ICD-10-CM

## 2023-06-06 PROCEDURE — ? DIAGNOSIS COMMENT

## 2023-06-06 PROCEDURE — 99213 OFFICE O/P EST LOW 20 MIN: CPT

## 2023-06-06 PROCEDURE — ? TREATMENT REGIMEN

## 2023-06-06 PROCEDURE — ? COUNSELING

## 2023-06-06 PROCEDURE — ? MEDICATION COUNSELING

## 2023-06-06 PROCEDURE — ? PRESCRIPTION

## 2023-06-06 PROCEDURE — ? FULL BODY SKIN EXAM - DECLINED

## 2023-06-06 PROCEDURE — ? PRESCRIPTION MEDICATION MANAGEMENT

## 2023-06-06 RX ORDER — FLUCONAZOLE 200 MG/1
TABLET ORAL
Qty: 4 | Refills: 0 | Status: ERX | COMMUNITY
Start: 2023-06-06

## 2023-06-06 RX ORDER — CLINDAMYCIN PHOSPHATE 10 MG/ML
SOLUTION TOPICAL
Qty: 60 | Refills: 3 | Status: ERX | COMMUNITY
Start: 2023-06-06

## 2023-06-06 RX ORDER — KETOCONAZOLE 20 MG/ML
SHAMPOO, SUSPENSION TOPICAL BIW
Qty: 120 | Refills: 6 | Status: ERX | COMMUNITY
Start: 2023-06-06

## 2023-06-06 RX ORDER — BENZOYL PEROXIDE 100 MG/G
LOTION TOPICAL
Qty: 237 | Refills: 3 | Status: ERX | COMMUNITY
Start: 2023-06-06

## 2023-06-06 RX ORDER — KETOCONAZOLE 20 MG/G
CREAM TOPICAL BID
Qty: 60 | Refills: 3 | Status: ERX | COMMUNITY
Start: 2023-06-06

## 2023-06-06 RX ORDER — CLOBETASOL PROPIONATE 0.5 MG/G
AEROSOL, FOAM TOPICAL
Qty: 50 | Refills: 1 | Status: ERX | COMMUNITY
Start: 2023-06-06

## 2023-06-06 RX ADMIN — CLOBETASOL PROPIONATE: 0.5 AEROSOL, FOAM TOPICAL at 00:00

## 2023-06-06 RX ADMIN — CLINDAMYCIN PHOSPHATE: 10 SOLUTION TOPICAL at 00:00

## 2023-06-06 RX ADMIN — KETOCONAZOLE: 20 SHAMPOO, SUSPENSION TOPICAL at 00:00

## 2023-06-06 RX ADMIN — BENZOYL PEROXIDE: 100 LOTION TOPICAL at 00:00

## 2023-06-06 RX ADMIN — KETOCONAZOLE: 20 CREAM TOPICAL at 00:00

## 2023-06-06 RX ADMIN — FLUCONAZOLE: 200 TABLET ORAL at 00:00

## 2023-06-06 ASSESSMENT — LOCATION DETAILED DESCRIPTION DERM
LOCATION DETAILED: LEFT MID-UPPER BACK
LOCATION DETAILED: RIGHT MID-UPPER BACK
LOCATION DETAILED: RIGHT INFERIOR UPPER BACK
LOCATION DETAILED: LEFT ANTECUBITAL SKIN
LOCATION DETAILED: LEFT INFERIOR UPPER BACK
LOCATION DETAILED: POSTERIOR MID-PARIETAL SCALP

## 2023-06-06 ASSESSMENT — LOCATION SIMPLE DESCRIPTION DERM
LOCATION SIMPLE: RIGHT UPPER BACK
LOCATION SIMPLE: LEFT ELBOW
LOCATION SIMPLE: POSTERIOR SCALP
LOCATION SIMPLE: LEFT UPPER BACK

## 2023-06-06 ASSESSMENT — LOCATION ZONE DERM
LOCATION ZONE: SCALP
LOCATION ZONE: TRUNK
LOCATION ZONE: ARM

## 2023-06-06 NOTE — PROCEDURE: DIAGNOSIS COMMENT
Comment: Biopsy-proven under [id number], consistent with inflammatory scarring alopecia. 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 \\n\\nClinical inflammation improved but not resolved on exam today, symptomatically patient reports that he is improved. Scarred area of alopecia similar to prior visit, appears unchanged. He has been inconsistent with topical therapy - discussed use more consistently. He prefers to continue with topical therapy alone for now\\n\\nTo consider in future: oral clindamycin + rifampin; isotretinoin.\\n\\nPrior treatments: minocycyline without much improvement. Doxycycline with improvement but mild GI upset and sensitivity to the sun Comment: Biopsy-proven under [id number], consistent with inflammatory scarring alopecia. 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 \\n\\nClinical inflammation improved but not resolved on exam today, symptomatically patient reports that he is improved. Scarred area of alopecia similar to prior visit, appears unchanged. He has been inconsistent with topical therapy - discussed use more consistently. He prefers to continue with topical therapy alone for now\\n\\nTo consider in future: oral clindamycin + rifampin; isotretinoin.\\n\\nPrior treatments: minocycyline without much improvement. Doxycycline with improvement but mild GI upset and sensitivity to the sun

## 2023-06-06 NOTE — PROCEDURE: MEDICATION COUNSELING
Addended by: ADELE HANNON on: 1/20/2020 09:16 AM     Modules accepted: Orders     Humira Counseling:  I discussed with the patient the risks of adalimumab including but not limited to myelosuppression, immunosuppression, autoimmune hepatitis, demyelinating diseases, lymphoma, and serious infections.  The patient understands that monitoring is required including a PPD at baseline and must alert us or the primary physician if symptoms of infection or other concerning signs are noted.

## 2023-06-06 NOTE — PROCEDURE: PRESCRIPTION MEDICATION MANAGEMENT
Detail Level: Zone
Render In Strict Bullet Format?: No
Initiate Treatment: ketoconazole 2 % shampoo. Shampoo to rash one to a few times per week as maintenance to try to prevent recurrence\\n\\nketoconazole 2 % topical cream. Apply twice daily to rash on back x 1 month. Then continue to use once daily or a few times per week for maintenance therapy. May take few to several months for color change to improve\\n\\nfluconazole 200 mg tablet. Take 400 mg by mouth once. Repeat 1 week later.

## 2023-06-06 NOTE — PROCEDURE: FULL BODY SKIN EXAM - DECLINED
Price (Do Not Change): 0.00
Detail Level: Simple
Body Of Note (Please Add Your Own Text Here): S/p limited skin exam today. Patient declined TBSE today
Instructions: This plan will send the code FBSD to the PM system.  DO NOT or CHANGE the price.

## 2024-02-25 NOTE — PROCEDURE: MEDICATION COUNSELING
25-Feb-2024 23:25 Clofazimine Pregnancy And Lactation Text: This medication is Pregnancy Category C and isn't considered safe during pregnancy. It is excreted in breast milk.

## 2024-11-07 NOTE — PROCEDURE: MEDICATION COUNSELING
Caller: Adeola ( friend/ care giver)     Doctor: Missy     Reason for call: Adeola stated pt has MRI scheduled for Monday 11/11/2024. Pt will need a prescription to help relax him for the MRI. Adeola would like a call back to let her know if Dr. Louis would be able to write the script.     Call back#: [phone]    [address] [facility] [address]  [address] [address]  Phone: [phone]  Fax: [phone]      5-Fu Counseling: 5-Fluorouracil Counseling:  I discussed with the patient the risks of 5-fluorouracil including but not limited to erythema, scaling, itching, weeping, crusting, and pain.